# Patient Record
Sex: MALE | Race: WHITE | NOT HISPANIC OR LATINO | Employment: FULL TIME | URBAN - METROPOLITAN AREA
[De-identification: names, ages, dates, MRNs, and addresses within clinical notes are randomized per-mention and may not be internally consistent; named-entity substitution may affect disease eponyms.]

---

## 2017-04-26 ENCOUNTER — HOSPITAL ENCOUNTER (OUTPATIENT)
Dept: ULTRASOUND IMAGING | Facility: CLINIC | Age: 32
Discharge: HOME | End: 2017-04-26
Attending: FAMILY MEDICINE

## 2017-04-26 ENCOUNTER — OFFICE VISIT - HEALTHEAST (OUTPATIENT)
Dept: FAMILY MEDICINE | Facility: CLINIC | Age: 32
End: 2017-04-26

## 2017-04-26 DIAGNOSIS — N45.2 ORCHITIS: ICD-10-CM

## 2017-04-26 DIAGNOSIS — Z48.02 VISIT FOR SUTURE REMOVAL: ICD-10-CM

## 2017-04-27 ENCOUNTER — COMMUNICATION - HEALTHEAST (OUTPATIENT)
Dept: FAMILY MEDICINE | Facility: CLINIC | Age: 32
End: 2017-04-27

## 2017-04-27 DIAGNOSIS — N45.2 ORCHITIS: ICD-10-CM

## 2017-05-05 ENCOUNTER — RECORDS - HEALTHEAST (OUTPATIENT)
Dept: ADMINISTRATIVE | Facility: OTHER | Age: 32
End: 2017-05-05

## 2021-05-30 VITALS — BODY MASS INDEX: 30.93 KG/M2 | WEIGHT: 197.5 LBS

## 2021-05-31 ENCOUNTER — RECORDS - HEALTHEAST (OUTPATIENT)
Dept: ADMINISTRATIVE | Facility: CLINIC | Age: 36
End: 2021-05-31

## 2021-06-10 NOTE — PROGRESS NOTES
ASSESSMENT:  1. Visit for suture removal  - Suture removal    2. Orchitis  - US Scrotum and Testicles W Duplex Ltd; Future    PLAN:  There are no Patient Instructions on file for this visit.    Orders Placed This Encounter   Procedures     Suture removal     This order was created via procedure documentation     US Scrotum and Testicles W Duplex Ltd     Standing Status:   Future     Standing Expiration Date:   4/26/2018     Order Specific Question:   Reason for Exam (Describe Symptoms):     Answer:   Right Testicular pain     Order Specific Question:   Can the procedure be changed per Radiologist protocol?     Answer:   Yes     There are no discontinued medications.    No Follow-up on file.     Patient tolerated the suture removal procedure without any complications. Patient can remove the bandage tomorrow, and the Steri Strips will fall off on their own. Patient can exercise as tolerable. With regard to orchitis, ordered a Scrotum and Testicles Ultrasound; will await results.     CHIEF COMPLAINT:  Chief Complaint   Patient presents with     Suture / Staple Removal     left side suture removal needed, placed at  ER on 4/16       HISTORY OF PRESENT ILLNESS:  Yoseph is a 32 y.o. male presenting to the clinic today for suture removal and for a testicle pain follow-up.     Laceration: He presented to the [facility] ER on April 16th with a laceration after cutting his left flank on a broken glass door. He had 9 stitches placed. Some glue was applied at the ER as well. Today, the laceration area is a little sore, but he otherwise feels fine. He would like to have the stitches removed today.     Testicle Pain: He was last seen for testicle pain on 6/15/2015. He had an Scrotum and Testicles Ultrasound on 6/18/2015 which was negative aside from a small benign cyst in the epididymis of the right testicle. He experiences right testicle soreness occasionally (about 1 or 2 days per month). When the pain occurs, he notices the  "pain more when he moves. Ibuprofen does not improve the pain.     Depression: He has not used bupropion for a couple months because it made him \"feel funny.\" He has been doing better with regard to depression because winter is over.     REVIEW OF SYSTEMS:   Comprehensive review of systems negative except as noted above.    PFSH:  Reviewed, as below.     TOBACCO USE:  History   Smoking Status     Never Smoker   Smokeless Tobacco     Never Used       VITALS:  Vitals:    04/26/17 1347   BP: 112/60   Pulse: 88   Weight: 197 lb 8 oz (89.6 kg)     Wt Readings from Last 3 Encounters:   04/26/17 197 lb 8 oz (89.6 kg)   04/16/17 185 lb (83.9 kg)   12/14/16 187 lb 12.8 oz (85.2 kg)     Body mass index is 30.93 kg/(m^2).       PHYSICAL EXAM:  General Appearance: Alert, cooperative, no distress, appears stated age  HEENT: Pupils equal, symmetric  Lungs: Respirations unlabored  Skin: Left flank stitches, wound held with 9 stitches, no signs of infection, stitches were removed without any complications, slight opening towards the superior aspect of the wound that was closed with Steri Strips.   Neurologic:  Alert and oriented times 3. Normal reflexes. Cranial nerves II-XII intact.   Psychiatric: Normal mood and affect.    Suture removal  Date/Time: 4/26/2017 2:02 PM  Performed by: SAUNDRA SHABAZZ  Authorized by: SAUNDRA SHABAZZ   Body area: trunk  Location details: left flank  Sutures Removed: 9  Post-removal: Steri-Strips applied  Facility: sutures placed in this facility  Patient tolerance: Patient tolerated the procedure well with no immediate complications        ADDITIONAL HISTORY SUMMARIZED (2): Reviewed 6/15/2015 note regarding testicle pain.   DECISION TO OBTAIN EXTRA INFORMATION (1): None.   RADIOLOGY TESTS (1): Ordered Scrotum and Testicles Ultrasound. Reviewed 6/18/2015 Scrotum and Testicles Ultrasound report, indication: Intermittent tenderness in the right testicle.   LABS (1): None.  MEDICINE " TESTS (1): None.  INDEPENDENT REVIEW (2 each): None.     The visit lasted a total of 20 minutes face to face with the patient. Over 50% of the time was spent counseling and educating the patient about suture removal and testicle pain.    IMeir, am scribing for and in the presence of, Dr. Ring.    I, Dr. Ring, personally performed the services described in this documentation, as scribed by Meir Arriola in my presence, and it is both accurate and complete.    MEDICATIONS:  Current Outpatient Prescriptions   Medication Sig Dispense Refill     buPROPion (WELLBUTRIN XL) 150 MG 24 hr tablet Take 1 tablet (150 mg total) by mouth daily. 60 tablet 0     No current facility-administered medications for this visit.        Total data points: 3

## 2021-06-16 PROBLEM — R10.31 RLQ ABDOMINAL PAIN: Status: ACTIVE | Noted: 2018-06-02

## 2021-06-26 ENCOUNTER — HEALTH MAINTENANCE LETTER (OUTPATIENT)
Age: 36
End: 2021-06-26

## 2021-10-16 ENCOUNTER — HEALTH MAINTENANCE LETTER (OUTPATIENT)
Age: 36
End: 2021-10-16

## 2022-02-03 ENCOUNTER — APPOINTMENT (OUTPATIENT)
Dept: ULTRASOUND IMAGING | Facility: CLINIC | Age: 37
End: 2022-02-03
Payer: COMMERCIAL

## 2022-02-03 ENCOUNTER — HOSPITAL ENCOUNTER (EMERGENCY)
Facility: CLINIC | Age: 37
Discharge: HOME | End: 2022-02-03
Attending: EMERGENCY MEDICINE | Admitting: EMERGENCY MEDICINE
Payer: COMMERCIAL

## 2022-02-03 ENCOUNTER — APPOINTMENT (OUTPATIENT)
Dept: MRI IMAGING | Facility: CLINIC | Age: 37
End: 2022-02-03
Attending: EMERGENCY MEDICINE
Payer: COMMERCIAL

## 2022-02-03 VITALS
DIASTOLIC BLOOD PRESSURE: 64 MMHG | BODY MASS INDEX: 34.46 KG/M2 | TEMPERATURE: 99.6 F | OXYGEN SATURATION: 97 % | SYSTOLIC BLOOD PRESSURE: 117 MMHG | RESPIRATION RATE: 20 BRPM | HEART RATE: 83 BPM | WEIGHT: 220 LBS

## 2022-02-03 DIAGNOSIS — R10.84 ABDOMINAL PAIN, GENERALIZED: ICD-10-CM

## 2022-02-03 LAB
ALBUMIN SERPL-MCNC: 4.3 G/DL (ref 3.5–5)
ALP SERPL-CCNC: 71 U/L (ref 45–120)
ALT SERPL W P-5'-P-CCNC: 33 U/L (ref 0–45)
ANION GAP SERPL CALCULATED.3IONS-SCNC: 9 MMOL/L (ref 5–18)
AST SERPL W P-5'-P-CCNC: 26 U/L (ref 0–40)
BILIRUB DIRECT SERPL-MCNC: 0.2 MG/DL
BILIRUB SERPL-MCNC: 0.4 MG/DL (ref 0–1)
BUN SERPL-MCNC: 13 MG/DL (ref 8–22)
CALCIUM SERPL-MCNC: 8.8 MG/DL (ref 8.5–10.5)
CHLORIDE BLD-SCNC: 109 MMOL/L (ref 98–107)
CO2 SERPL-SCNC: 21 MMOL/L (ref 22–31)
CREAT SERPL-MCNC: 1.05 MG/DL (ref 0.7–1.3)
GFR SERPL CREATININE-BSD FRML MDRD: >90 ML/MIN/1.73M2
GLUCOSE BLD-MCNC: 106 MG/DL (ref 70–125)
LIPASE SERPL-CCNC: 35 U/L (ref 0–52)
POTASSIUM BLD-SCNC: 4.1 MMOL/L (ref 3.5–5)
PROT SERPL-MCNC: 7.4 G/DL (ref 6–8)
SODIUM SERPL-SCNC: 139 MMOL/L (ref 136–145)

## 2022-02-03 PROCEDURE — 96375 TX/PRO/DX INJ NEW DRUG ADDON: CPT | Mod: 59

## 2022-02-03 PROCEDURE — 250N000011 HC RX IP 250 OP 636: Performed by: EMERGENCY MEDICINE

## 2022-02-03 PROCEDURE — A9585 GADOBUTROL INJECTION: HCPCS | Performed by: EMERGENCY MEDICINE

## 2022-02-03 PROCEDURE — 250N000013 HC RX MED GY IP 250 OP 250 PS 637

## 2022-02-03 PROCEDURE — 255N000002 HC RX 255 OP 636: Performed by: EMERGENCY MEDICINE

## 2022-02-03 PROCEDURE — 74183 MRI ABD W/O CNTR FLWD CNTR: CPT

## 2022-02-03 PROCEDURE — 36415 COLL VENOUS BLD VENIPUNCTURE: CPT

## 2022-02-03 PROCEDURE — 250N000009 HC RX 250

## 2022-02-03 PROCEDURE — 82248 BILIRUBIN DIRECT: CPT

## 2022-02-03 PROCEDURE — 83690 ASSAY OF LIPASE: CPT

## 2022-02-03 PROCEDURE — 96374 THER/PROPH/DIAG INJ IV PUSH: CPT | Mod: 59

## 2022-02-03 PROCEDURE — 76700 US EXAM ABDOM COMPLETE: CPT

## 2022-02-03 PROCEDURE — 250N000011 HC RX IP 250 OP 636

## 2022-02-03 PROCEDURE — 99285 EMERGENCY DEPT VISIT HI MDM: CPT | Mod: 25

## 2022-02-03 PROCEDURE — 80053 COMPREHEN METABOLIC PANEL: CPT

## 2022-02-03 RX ORDER — FAMOTIDINE 20 MG/1
20 TABLET, FILM COATED ORAL ONCE
Status: COMPLETED | OUTPATIENT
Start: 2022-02-03 | End: 2022-02-03

## 2022-02-03 RX ORDER — ONDANSETRON 4 MG/1
4 TABLET, ORALLY DISINTEGRATING ORAL ONCE
Status: COMPLETED | OUTPATIENT
Start: 2022-02-03 | End: 2022-02-03

## 2022-02-03 RX ORDER — HYDROMORPHONE HYDROCHLORIDE 1 MG/ML
0.5 INJECTION, SOLUTION INTRAMUSCULAR; INTRAVENOUS; SUBCUTANEOUS ONCE
Status: COMPLETED | OUTPATIENT
Start: 2022-02-03 | End: 2022-02-03

## 2022-02-03 RX ORDER — GADOBUTROL 604.72 MG/ML
10 INJECTION INTRAVENOUS ONCE
Status: COMPLETED | OUTPATIENT
Start: 2022-02-03 | End: 2022-02-03

## 2022-02-03 RX ORDER — KETOROLAC TROMETHAMINE 15 MG/ML
15 INJECTION, SOLUTION INTRAMUSCULAR; INTRAVENOUS ONCE
Status: COMPLETED | OUTPATIENT
Start: 2022-02-03 | End: 2022-02-03

## 2022-02-03 RX ORDER — SERTRALINE HYDROCHLORIDE 100 MG/1
100 TABLET, FILM COATED ORAL AT BEDTIME
COMMUNITY

## 2022-02-03 RX ADMIN — HYDROMORPHONE HYDROCHLORIDE 0.5 MG: 1 INJECTION, SOLUTION INTRAMUSCULAR; INTRAVENOUS; SUBCUTANEOUS at 10:57

## 2022-02-03 RX ADMIN — KETOROLAC TROMETHAMINE 15 MG: 15 INJECTION, SOLUTION INTRAMUSCULAR; INTRAVENOUS at 10:52

## 2022-02-03 RX ADMIN — FAMOTIDINE 20 MG: 20 TABLET, FILM COATED ORAL at 08:48

## 2022-02-03 RX ADMIN — GADOBUTROL 10 ML: 604.72 INJECTION INTRAVENOUS at 15:30

## 2022-02-03 RX ADMIN — ONDANSETRON 4 MG: 4 TABLET, ORALLY DISINTEGRATING ORAL at 07:34

## 2022-02-03 RX ADMIN — LIDOCAINE HYDROCHLORIDE 30 ML: 20 SOLUTION TOPICAL at 08:48

## 2022-02-03 NOTE — ED PROVIDER NOTES
Emergency Department Midlevel Supervisory Note     I personally saw the patient and performed a substantive portion of the visit including all aspects of the medical decision making.    IMPRESSION  No diagnosis found.        MDM  36-year-old male presented to the ED for evaluation of epigastric abdominal pain that woke him from sleep earlier this morning.  Patient denied any other significant symptoms.  In the ED the patient was noted to be slightly hypertensive but he was otherwise hemodynamically stable.  The patient did not appear to be in any obvious distress or discomfort at the time of my evaluation.  On exam he was noted to have mild epigastric tenderness palpation without evidence of an acute abdomen.    CBC, BMP, hepatic panel, lipase, and right upper quadrant ultrasound were all reassuring.    The patient was reevaluated and informed of the reassuring lab and ultrasound results.  The patient was given a GI cocktail and oral famotidine to treat suspected GERD/PUD.    The patient was reevaluated after receiving the GI cocktail and famotidine.  The patient reported no change in his abdominal discomfort.  The patient's case was then discussed with the on-call gastroenterologist who stated that an MRCP could be considered.  The patient was again reevaluated.  He stated that the diagnosis of choledocholithiasis was initially missed on at least one or two occasions in the past until an MRCP was performed.  An IV was then placed.  The patient was given IV pain medications and an MRCP was ordered.     Patient reported that his pain is improved with the IV Dilaudid.  Currently the patient is scheduled to undergo ERCP at 15:00 today.  The patient's care will be turned over to the oncoming ED provider Dr. Ray.  If there is no stone noted on the MRCP the plan is for discharge home with outpatient follow-up.        ED Course:  7:00 AM Dr. Christiane Sibley, resident, staffed patient with me. I agree with their  assessment and plan of management, and I will see the patient.  I met with the patient to introduce myself, gather additional history, perform my initial exam, and discuss the plan.   12:39 PM Plan for MRCP at 3pm today. Patient updated. Will be signed out to Dr. Ray pending MRCP.     Brief HPI:     Yoseph Duong is a 36 year old male with a pertinent PMHx of cholecystectomy and choledocholithiasis who presents for evaluation of epigastric abdominal pain which began last night. His pain is non-radiating and waxes/wanes in severity, currently a 5/10 but does increase to a 10/10 at times. His current pain feels the same as when he had his gallbladder removed. Patient states he has known bile duct stones, seen on MRI and ERCP. Also endorses mild nausea and 1 episode of diarrhea this morning. No vomiting. No fever. Of note, patient is in remission of testicular cancer s/p left orchioectomy.     I, Hemal Cameron, am serving as a scribe to document services personally performed by Dr. Carlos Dover, based on my observations and the provider's statements to me.   I, Carlos Dover, DO, attest that Hemal Cameron was acting in a scribe capacity, has observed my performance of the services and has documented them in accordance with my direction.    Brief Physical Exam:  Constitutional:  Alert, in no acute distress  EYES: Conjunctivae clear  HENT:  Atraumatic, normocephalic  Respiratory:  Respirations even, unlabored, in no acute respiratory distress  Cardiovascular:  Regular rate and rhythm, good peripheral perfusion  GI: Soft, nondistended, nontender, no palpable masses, no rebound, no guarding   Musculoskeletal:  No edema. No cyanosis. Range of motion major extremities intact.    Integument: Warm, Dry, No erythema, No rash.   Neurologic:  Alert & oriented, no focal deficits noted  Psych: Normal mood and affect    Labs and Imaging:  Results for orders placed or performed during the hospital encounter of 02/03/22   Abdomen  US, complete    Impression    IMPRESSION:  1.  No acute abnormality in the abdomen.       Result Value Ref Range    Lipase 35 0 - 52 U/L   Basic metabolic panel   Result Value Ref Range    Sodium 139 136 - 145 mmol/L    Potassium 4.1 3.5 - 5.0 mmol/L    Chloride 109 (H) 98 - 107 mmol/L    Carbon Dioxide (CO2) 21 (L) 22 - 31 mmol/L    Anion Gap 9 5 - 18 mmol/L    Urea Nitrogen 13 8 - 22 mg/dL    Creatinine 1.05 0.70 - 1.30 mg/dL    Calcium 8.8 8.5 - 10.5 mg/dL    Glucose 106 70 - 125 mg/dL    GFR Estimate >90 >60 mL/min/1.73m2   Hepatic function panel   Result Value Ref Range    Bilirubin Total 0.4 0.0 - 1.0 mg/dL    Bilirubin Direct 0.2 <=0.5 mg/dL    Protein Total 7.4 6.0 - 8.0 g/dL    Albumin 4.3 3.5 - 5.0 g/dL    Alkaline Phosphatase 71 45 - 120 U/L    AST 26 0 - 40 U/L    ALT 33 0 - 45 U/L     I have reviewed the relevant laboratory and radiology studies    Procedures:  I was present for the key portions of this procedure: none    Carlos Dover DO  [facility] EMERGENCY ROOM  [facility] [facility] [id number] [phone]     Carlos Dover DO  02/03/22 7117

## 2022-02-03 NOTE — ED PROVIDER NOTES
EMERGENCY DEPARTMENT SIGN OUT NOTE      ED COURSE AND MEDICAL DECISION MAKING  Patient was signed out to me by Dr. Carlos Dover at 2:08 PM      In brief, Yoseph Duong is a 36 year old with a pertinent PMHx of cholecystectomy and choledocholithiasis who presents for evaluation of epigastric abdominal pain which began last night. His pain is non-radiating and waxes/wanes in severity, currently a 5/10 but does increase to a 10/10 at times. His current pain feels the same as when he had his gallbladder removed. Patient states he has known bile duct stones, seen on MRI and ERCP. Also endorses mild nausea and 1 episode of diarrhea this morning. No vomiting. No fever. Of note, patient is in remission of testicular cancer s/p left orchioectomy.    At time of sign out, disposition was pending MRCP.    MRCP returned normal.  LFTs, lipase, bilirubin all within normal limits.  Discussed findings with patient, discussed plan of discharge home, can return to ED if symptoms worsen.  If symptoms return or worsen, be worth discussing with GI about doing an ERCP in the future.    The creation of this record is based on the scribe s observations of the work being performed by Cirilo Ray MD, and the provider s statements to them. This document has been checked and approved by Cirilo Ray MD.    FINAL IMPRESSION    1. Abdominal pain, generalized      RADIOLOGY    MR Abdomen MRCP w/o & w Contrast   Final Result   IMPRESSION:   1.  Bile duct caliber within normal limits with no stone, stricture or mass.   2.  Cholecystectomy.   3.  Mild diffuse hepatic steatosis.      Abdomen US, complete   Final Result   IMPRESSION:   1.  No acute abnormality in the abdomen.                 Cirilo Ray MD  02/03/22 0447

## 2022-02-03 NOTE — ED TRIAGE NOTES
Started at 0530 am. Pain described as sharp.  States pain does not go through to back.  Denies having taken any meds for pain.

## 2022-02-03 NOTE — ED PROVIDER NOTES
EMERGENCY DEPARTMENT ENCOUNTER      CHIEF COMPLAINT    Epigastric pain      BEHZAD Hameed is a 36 year old male with a past medical history of testicular cancer s/p left orchiectomy in remission, overweight, recurrent major depressive disorder in partial remission, migraines, cholecystectomy, and choledocholithiasis. He presents to the [facility] emergency department for increased epigastric pain which started early this morning.    Patient states the pain is similar to the last time he had a stone in his bile ducts. He had a cholecystectomy and later had an ERCP for choledocholithiasis (10/24/2021). Biliary sphincterotomy was performed and one plastic biliary stent was placed into the common bile duct at the same time. His pain is sharp and does not radiate. It waxes and wanes. Currently he rates the pain as a 5/10. At the worst, it is a 10/10. The most comfortable position is standing and leaning forward slightly. Laying down is okay. Sitting up is the worst. He states it is not related to foods he eats. He endorses nausea and one episode of diarrhea this morning.       REVIEW OF SYSTEMS      Constitutional: Negative for fever, chills, and fatigue.      HENT: Negative for neck pain.      Eyes: Negative for visual disturbance.     Respiratory: Negative for chest tightness. Positive for shortness of breath when pain exacerbates.      Cardiovascular: Negative for chest pain.     Gastrointestinal: Negative for vomiting and diarrhea. Positive for nausea and non-radiating epigastric pain.    Genitourinary: Negative for dysuria.     Musculoskeletal: Negative for back pain.     Skin: Negative for color change.     Neurological: Negative for dizziness, weakness, and numbness.     All other systems reviewed and are negative.      PAST MEDICAL HISTORY      No past medical history on file. and   Patient Active Problem List   Diagnosis     Overweight     Recurrent major depressive disorder, in partial remission (H)     ADHD,  Predominantly Inattentive Type     Common Migraine (Without Aura)     Breast pain, right     Orchitis     Choledocholithiasis     Elevated LFTs     RLQ abdominal pain     Leukocytosis, unspecified type         SURGICAL HISTORY       Adenoidectomy    Laparoscopic cholecystectomy (N/A, 12/9/2014)    Endoscopic retrograde cholangiopancreatogram (N/A, 12/7/2016)         CURRENT MEDICATIONS      Patient's Medications   New Prescriptions    No medications on file   Previous Medications    SENNA-DOCUSATE (PERICOLACE) 8.6-50 MG TABLET    [SENNA-DOCUSATE (PERICOLACE) 8.6-50 MG TABLET] Take 1 tablet by mouth 2 (two) times a day as needed for constipation.    TRAMADOL (ULTRAM) 50 MG TABLET    [TRAMADOL (ULTRAM) 50 MG TABLET] Take 1 tablet (50 mg total) by mouth every 8 (eight) hours as needed for pain.   Modified Medications    No medications on file   Discontinued Medications    No medications on file         ALLERGIES      Allergies   Allergen Reactions     Haemophilus Influenzae Anaphylaxis and Hives         FAMILY HISTORY      Family History   Problem Relation Age of Onset     CABG Father      Coronary Artery Disease Paternal Grandfather          SOCIAL HISTORY      Social History     Socioeconomic History     Marital status:      Spouse name: Not on file     Number of children: Not on file     Years of education: Not on file     Highest education level: Not on file   Occupational History     Not on file   Tobacco Use     Smoking status: Never Smoker     Smokeless tobacco: Never Used   Substance and Sexual Activity     Alcohol use: No     Drug use: No     Sexual activity: Not on file   Other Topics Concern     Not on file   Social History Narrative     Not on file     Social Determinants of Health     Financial Resource Strain: Not on file   Food Insecurity: Not on file   Transportation Needs: Not on file   Physical Activity: Not on file   Stress: Not on file   Social Connections: Not on file   Intimate Partner  Violence: Not on file   Housing Stability: Not on file         PHYSICAL EXAM      VITAL SIGNS: BP (!) 147/88   Pulse 89   Resp 20   Wt 99.8 kg (220 lb)   SpO2 98%   BMI 34.46 kg/m         Constitutional:  Well developed, well nourished, no acute distress     EYES: Conjunctivae clear    HENT:  Atraumatic, external ears normal.    Respiratory:  No respiratory distress, normal breath sounds, no rales, no wheezing     Cardiovascular:  Normal rate, normal rhythm, no murmurs.  No chest tenderness    GI:  Soft, nondistended, no palpable masses, no rebound, no guarding. Tenderness in epigastric region.     Musculoskeletal:  No edema.  Range of motion major extremities intact. No tenderness to palpation or major deformities noted.      Integument: Warm, Dry, No erythema, No rash.     Neurologic:  Alert, ambulatory    Psych: Affect normal, Mood normal.    LABS:  Hepatic function panel:  Bilirubin Total 0.4   Bilirubin Direct 0.2   Protein Total 7.4   Albumin 4.3   Alkaline Phosphatase 71   AST 26   ALT 33     Lipase: 35    BMP:  Sodium 139   Potassium 4.1   Chloride 109   Carbon Dioxide 21   Anion Gap 9   Urea Nitrogen 13   Creatinine 1.05   Calcium 8.8   Glucose 106   GFR Estimate >90       IMAGING:   Abdomen US, complete  FINDINGS:     GALLBLADDER: Surgically removed.     BILE DUCTS: No biliary dilatation. The common duct measures 6 mm. No shadowing stone in the visualized portions of the common bile duct.     LIVER: Normal parenchyma with smooth contour. No focal mass.     RIGHT KIDNEY: Normal size. Normal echogenicity with no hydronephrosis or mass.      LEFT KIDNEY: Normal size. No hydronephrosis. A simple 0.8 cm cyst in the lower pole, no follow-up required.      SPLEEN: Normal.     PANCREAS: The visualized portions are normal.     AORTA: Normal in caliber.      IVC: Normal where visualized.     No ascites.                                              IMPRESSION:  1.  No acute abnormality in the abdomen.      ED  COURSE & MEDICAL DECISION MAKING    0657: I met the patient and obtained a history and physical.  0830: Patient's ultrasound shows no abnormality in the abdomen. He is given famotidine and a GI cocktail. Will reassess for improvement.  0850: Dr. Carlos Dover reevaluated the patient.  0925: I reassessed patient and he states he had no improvement with GI cocktail.  1005: Patient states he still has no improvement in his pain. Will contact GI provider to see if anymore studies should be done at this time.  1015: Talked to Dr. Mayer, gastroenterologist, about further work-up. In her expertise, she believes no additional imaging is need at this time due to laboratory work being unremarkable and only one day of symptoms. MRCP can be used for reassurance for patient if requested. Another option is to have follow up with PCP for hepatic function monitoring if symptoms are progressive or continual.  1030: Dr. Dover talked to patient. Patient was addiment about getting MRCP because last time he had normal lab work and imaging until the MRCP. We will schedule MRCP and give Toradol for pain.  1230: Dr. Dover called radiology to find that MRCP will be performed around 1500 today. Patient was updated on information.  1530: Sign-out given to Dr. Ray.      Christiane Sibley MD PGY1  [facility] Resident       Christiane Sibley MD  Resident  02/03/22 1436

## 2022-02-03 NOTE — PHARMACY-ADMISSION MEDICATION HISTORY
Pharmacy Note - Admission Medication History    Pertinent Provider Information: n/a     ______________________________________________________________________    Prior To Admission (PTA) med list completed and updated in EMR.       PTA Med List   Medication Sig Last Dose     phenylephrine-bropheniramine-dextromethorphan (DIMETAPP-DM) 2.5-1-5 MG/5ML ELIX solution Take 10 mLs by mouth every 6 hours as needed for cough 2/2/2022 at Unknown time     sertraline (ZOLOFT) 100 MG tablet Take 100 mg by mouth At Bedtime 2/2/2022 at Unknown time       Information source(s): Patient and CareEverywhere/SureScripts  Method of interview communication: in-person    Summary of Changes to PTA Med List  New: sertraline, dimetapp  Discontinued: senna-s, tramadol  Changed: none    Patient was asked about OTC/herbal products specifically.  PTA med list reflects this.    In the past week, patient estimated taking medication this percent of the time:  greater than 90%.    Allergies were reviewed, assessed, and updated with the patient.      Patient does not use any multi-dose medications prior to admission.    The information provided in this note is only as accurate as the sources available at the time of the update(s).    Thank you for the opportunity to participate in the care of this patient.    [facility], located within [facility]  2/3/2022 12:58 PM

## 2022-02-27 ENCOUNTER — APPOINTMENT (OUTPATIENT)
Dept: CT IMAGING | Facility: CLINIC | Age: 37
End: 2022-02-27
Attending: EMERGENCY MEDICINE
Payer: COMMERCIAL

## 2022-02-27 ENCOUNTER — HOSPITAL ENCOUNTER (EMERGENCY)
Facility: CLINIC | Age: 37
Discharge: HOME | End: 2022-02-27
Attending: EMERGENCY MEDICINE | Admitting: EMERGENCY MEDICINE
Payer: COMMERCIAL

## 2022-02-27 VITALS
DIASTOLIC BLOOD PRESSURE: 81 MMHG | SYSTOLIC BLOOD PRESSURE: 120 MMHG | WEIGHT: 213 LBS | BODY MASS INDEX: 33.36 KG/M2 | HEART RATE: 59 BPM | RESPIRATION RATE: 16 BRPM | OXYGEN SATURATION: 93 %

## 2022-02-27 DIAGNOSIS — R10.11 ABDOMINAL PAIN, RIGHT UPPER QUADRANT: ICD-10-CM

## 2022-02-27 PROBLEM — Z90.79 S/P ORCHIECTOMY: Status: ACTIVE | Noted: 2021-10-24

## 2022-02-27 PROBLEM — C62.92: Status: ACTIVE | Noted: 2021-10-24

## 2022-02-27 LAB
ALBUMIN SERPL-MCNC: 4.5 G/DL (ref 3.5–5)
ALBUMIN UR-MCNC: 20 MG/DL
ALP SERPL-CCNC: 78 U/L (ref 45–120)
ALT SERPL W P-5'-P-CCNC: 55 U/L (ref 0–45)
ANION GAP SERPL CALCULATED.3IONS-SCNC: 14 MMOL/L (ref 5–18)
APPEARANCE UR: CLEAR
AST SERPL W P-5'-P-CCNC: 25 U/L (ref 0–40)
BASOPHILS # BLD AUTO: 0.1 10E3/UL (ref 0–0.2)
BASOPHILS NFR BLD AUTO: 1 %
BILIRUB SERPL-MCNC: 0.5 MG/DL (ref 0–1)
BILIRUB UR QL STRIP: NEGATIVE
BUN SERPL-MCNC: 14 MG/DL (ref 8–22)
CALCIUM SERPL-MCNC: 8.9 MG/DL (ref 8.5–10.5)
CHLORIDE BLD-SCNC: 108 MMOL/L (ref 98–107)
CO2 SERPL-SCNC: 18 MMOL/L (ref 22–31)
COLOR UR AUTO: YELLOW
CREAT SERPL-MCNC: 1.07 MG/DL (ref 0.7–1.3)
EOSINOPHIL # BLD AUTO: 0.1 10E3/UL (ref 0–0.7)
EOSINOPHIL NFR BLD AUTO: 1 %
ERYTHROCYTE [DISTWIDTH] IN BLOOD BY AUTOMATED COUNT: 12.7 % (ref 10–15)
GFR SERPL CREATININE-BSD FRML MDRD: >90 ML/MIN/1.73M2
GLUCOSE BLD-MCNC: 139 MG/DL (ref 70–125)
GLUCOSE UR STRIP-MCNC: NEGATIVE MG/DL
HCT VFR BLD AUTO: 45.9 % (ref 40–53)
HGB BLD-MCNC: 16.2 G/DL (ref 13.3–17.7)
HGB UR QL STRIP: NEGATIVE
IMM GRANULOCYTES # BLD: 0 10E3/UL
IMM GRANULOCYTES NFR BLD: 0 %
KETONES UR STRIP-MCNC: 60 MG/DL
LEUKOCYTE ESTERASE UR QL STRIP: NEGATIVE
LIPASE SERPL-CCNC: 33 U/L (ref 0–52)
LYMPHOCYTES # BLD AUTO: 5.2 10E3/UL (ref 0.8–5.3)
LYMPHOCYTES NFR BLD AUTO: 55 %
MCH RBC QN AUTO: 29.3 PG (ref 26.5–33)
MCHC RBC AUTO-ENTMCNC: 35.3 G/DL (ref 31.5–36.5)
MCV RBC AUTO: 83 FL (ref 78–100)
MONOCYTES # BLD AUTO: 0.8 10E3/UL (ref 0–1.3)
MONOCYTES NFR BLD AUTO: 8 %
MUCOUS THREADS #/AREA URNS LPF: PRESENT /LPF
NEUTROPHILS # BLD AUTO: 3.4 10E3/UL (ref 1.6–8.3)
NEUTROPHILS NFR BLD AUTO: 35 %
NITRATE UR QL: NEGATIVE
NRBC # BLD AUTO: 0 10E3/UL
NRBC BLD AUTO-RTO: 0 /100
PH UR STRIP: 5 [PH] (ref 5–7)
PLAT MORPH BLD: ABNORMAL
PLATELET # BLD AUTO: 357 10E3/UL (ref 150–450)
POTASSIUM BLD-SCNC: 4 MMOL/L (ref 3.5–5)
PROT SERPL-MCNC: 7.7 G/DL (ref 6–8)
RBC # BLD AUTO: 5.52 10E6/UL (ref 4.4–5.9)
RBC MORPH BLD: ABNORMAL
RBC URINE: 0 /HPF
SODIUM SERPL-SCNC: 140 MMOL/L (ref 136–145)
SP GR UR STRIP: 1.03 (ref 1–1.03)
UROBILINOGEN UR STRIP-MCNC: <2 MG/DL
VARIANT LYMPHS BLD QL SMEAR: PRESENT
WBC # BLD AUTO: 9.6 10E3/UL (ref 4–11)
WBC URINE: 1 /HPF

## 2022-02-27 PROCEDURE — 96374 THER/PROPH/DIAG INJ IV PUSH: CPT

## 2022-02-27 PROCEDURE — 96375 TX/PRO/DX INJ NEW DRUG ADDON: CPT

## 2022-02-27 PROCEDURE — 85025 COMPLETE CBC W/AUTO DIFF WBC: CPT | Performed by: EMERGENCY MEDICINE

## 2022-02-27 PROCEDURE — 81001 URINALYSIS AUTO W/SCOPE: CPT | Performed by: EMERGENCY MEDICINE

## 2022-02-27 PROCEDURE — 82040 ASSAY OF SERUM ALBUMIN: CPT | Performed by: EMERGENCY MEDICINE

## 2022-02-27 PROCEDURE — 74176 CT ABD & PELVIS W/O CONTRAST: CPT

## 2022-02-27 PROCEDURE — 250N000011 HC RX IP 250 OP 636: Performed by: EMERGENCY MEDICINE

## 2022-02-27 PROCEDURE — 36415 COLL VENOUS BLD VENIPUNCTURE: CPT | Performed by: EMERGENCY MEDICINE

## 2022-02-27 PROCEDURE — 83690 ASSAY OF LIPASE: CPT | Performed by: EMERGENCY MEDICINE

## 2022-02-27 PROCEDURE — 99285 EMERGENCY DEPT VISIT HI MDM: CPT | Mod: 25

## 2022-02-27 PROCEDURE — 258N000003 HC RX IP 258 OP 636: Performed by: EMERGENCY MEDICINE

## 2022-02-27 PROCEDURE — 96361 HYDRATE IV INFUSION ADD-ON: CPT

## 2022-02-27 PROCEDURE — 80053 COMPREHEN METABOLIC PANEL: CPT | Performed by: EMERGENCY MEDICINE

## 2022-02-27 PROCEDURE — 96376 TX/PRO/DX INJ SAME DRUG ADON: CPT

## 2022-02-27 RX ORDER — OXYCODONE HYDROCHLORIDE 5 MG/1
5 TABLET ORAL EVERY 6 HOURS PRN
Qty: 12 TABLET | Refills: 0 | Status: SHIPPED | OUTPATIENT
Start: 2022-02-27 | End: 2022-03-02

## 2022-02-27 RX ORDER — SODIUM CHLORIDE 9 MG/ML
INJECTION, SOLUTION INTRAVENOUS CONTINUOUS
Status: DISCONTINUED | OUTPATIENT
Start: 2022-02-27 | End: 2022-02-27 | Stop reason: HOSPADM

## 2022-02-27 RX ORDER — HYDROMORPHONE HYDROCHLORIDE 1 MG/ML
0.5 INJECTION, SOLUTION INTRAMUSCULAR; INTRAVENOUS; SUBCUTANEOUS
Status: DISCONTINUED | OUTPATIENT
Start: 2022-02-27 | End: 2022-02-27 | Stop reason: HOSPADM

## 2022-02-27 RX ORDER — KETOROLAC TROMETHAMINE 15 MG/ML
15 INJECTION, SOLUTION INTRAMUSCULAR; INTRAVENOUS ONCE
Status: COMPLETED | OUTPATIENT
Start: 2022-02-27 | End: 2022-02-27

## 2022-02-27 RX ORDER — DROPERIDOL 2.5 MG/ML
2.5 INJECTION, SOLUTION INTRAMUSCULAR; INTRAVENOUS ONCE
Status: COMPLETED | OUTPATIENT
Start: 2022-02-27 | End: 2022-02-27

## 2022-02-27 RX ADMIN — DROPERIDOL 2.5 MG: 2.5 INJECTION, SOLUTION INTRAMUSCULAR; INTRAVENOUS at 03:55

## 2022-02-27 RX ADMIN — HYDROMORPHONE HYDROCHLORIDE 0.5 MG: 1 INJECTION, SOLUTION INTRAMUSCULAR; INTRAVENOUS; SUBCUTANEOUS at 04:00

## 2022-02-27 RX ADMIN — KETOROLAC TROMETHAMINE 15 MG: 15 INJECTION, SOLUTION INTRAMUSCULAR; INTRAVENOUS at 03:53

## 2022-02-27 RX ADMIN — HYDROMORPHONE HYDROCHLORIDE 0.5 MG: 1 INJECTION, SOLUTION INTRAMUSCULAR; INTRAVENOUS; SUBCUTANEOUS at 04:33

## 2022-02-27 RX ADMIN — SODIUM CHLORIDE 1000 ML: 9 INJECTION, SOLUTION INTRAVENOUS at 03:57

## 2022-02-27 ASSESSMENT — ENCOUNTER SYMPTOMS
NAUSEA: 1
HEMATURIA: 0
VOMITING: 0
ABDOMINAL PAIN: 1
DIFFICULTY URINATING: 0
DIARRHEA: 0
CONSTIPATION: 0

## 2022-02-27 NOTE — ED PROVIDER NOTES
EMERGENCY DEPARTMENT ENCOUNTER      NAME: Yoseph Duong  AGE: 37 year old male  YOB: 1985  MRN: [id number]  EVALUATION DATE & TIME: 2/27/2022  3:38 AM    PCP: Carlie Cerrato    ED PROVIDER: Ryan Cook M.D.      Chief Complaint   Patient presents with     Abdominal Pain         FINAL IMPRESSION:  1. Abdominal pain, right upper quadrant          ED COURSE & MEDICAL DECISION MAKING:    Pertinent Labs & Imaging studies reviewed. (See chart for details)  37 year old male presents to the Emergency Department for evaluation of right upper quadrant pain. Sudden onset of right upper quadrant pain. Is status post cholecystectomy choledocholithiasis. Had similar symptoms in the beginning of this month. Did get a CT scan no obvious cause of his symptoms. Labs are normal. Pain improved with IV Toradol and IV droperidol. Does have some pneumobilia on the CT scan though this is unchanged. During previous visit had an MRCP that was normal. No signs of kidney stone. No signs of bowel obstruction. Has an appointment on Tuesday with GI. We will have him follow-up then. Did give him pain medicine for home. Will return for worsening symptoms    3:40 AM I met with the patient to gather history and to perform my initial exam. I discussed the plan for care while in the Emergency Department. PPE: Provider wore gloves, eye protection, and paper mask.       4:55 AM I rechecked and updated the patient. I discussed the plan for discharge with the patient, and patient is agreeable. We discussed supportive cares at home and reasons for return to the ER including new or worsening symptoms - all questions and concerns addressed. Patient to be discharged by RN.     At the conclusion of the encounter I discussed the results of all of the tests and the disposition. The questions were answered. The patient or family acknowledged understanding and was agreeable with the care plan.            MEDICATIONS GIVEN IN THE  EMERGENCY:  Medications   0.9% sodium chloride BOLUS (0 mLs Intravenous Stopped 2/27/22 0500)     Followed by   sodium chloride 0.9% infusion ( Intravenous Not Given 2/27/22 0518)   HYDROmorphone (PF) (DILAUDID) injection 0.5 mg (0.5 mg Intravenous Given 2/27/22 0433)   droperidol (INAPSINE) injection 2.5 mg (2.5 mg Intravenous Given 2/27/22 0355)   ketorolac (TORADOL) injection 15 mg (15 mg Intravenous Given 2/27/22 0353)       NEW PRESCRIPTIONS STARTED AT TODAY'S ER VISIT  Discharge Medication List as of 2/27/2022  5:10 AM      START taking these medications    Details   oxyCODONE (ROXICODONE) 5 MG tablet Take 1 tablet (5 mg) by mouth every 6 hours as needed, Disp-12 tablet, R-0, Local Print                =================================================================    HPI    Patient information was obtained from: The patient    Use of : N/A       Yoseph KIM Ad is a 37 year old male with a pertinent history of cholecystectomy, and choledocholithiasis who presents to this ED via walk-in for evaluation of RUQ abdominal pain.    The patient reports that at 0230 this morning (2/27) he had onset of an abdominal discomfort that has progressively worsened. Approximately 15 minutes prior to arrival his pain became severely worse and it is localized to his right upper quadrant. Additionally, he endorses some nausea but denies vomiting. The patient has previously been evaluated for abdominal pain but states currently his pain is much worse than before. He notes that his wife drove him to the ED tonight. The patient denies vomiting, difficulty urinating, constipation, diarrhea, blood in urine, and any other symptoms or complaints at this time.       Per Chart Review,  02/03/2022 The patient presented to [facility]'s ER for evaluation of abdominal pain. He had known bile duct stones (previously seen on MRI and ERCP). RUQ US was reassuring. MRCP returned normal.  LFTs, lipase, bilirubin all within normal  limits. Provider discussed return precautions and discharged the patient.    REVIEW OF SYSTEMS   Review of Systems   Gastrointestinal: Positive for abdominal pain (RUQ) and nausea. Negative for constipation, diarrhea and vomiting.   Genitourinary: Negative for difficulty urinating and hematuria.   All other systems reviewed and are negative.       PAST MEDICAL HISTORY:  History reviewed. No pertinent past medical history.    PAST SURGICAL HISTORY:  Past Surgical History:   Procedure Laterality Date     ADENOIDECTOMY       ENDOSCOPIC RETROGRADE CHOLANGIOPANCREATOGRAM N/A 12/7/2016    Procedure: ENDOSCOPIC RETROGRADE CHOLANGIOPANCREATOGRAPHY;  Surgeon: Theodore Rojas MD;  Location: [facility];  Service:      LAPAROSCOPIC CHOLECYSTECTOMY N/A 12/9/2014    Procedure: CHOLECYSTECTOMY LAPAROSCOPIC;  Surgeon: Kashif Coffman MD;  Location: [facility] OR;  Service:            CURRENT MEDICATIONS:    Current Facility-Administered Medications   Medication     HYDROmorphone (PF) (DILAUDID) injection 0.5 mg     sodium chloride 0.9% infusion     Current Outpatient Medications   Medication     oxyCODONE (ROXICODONE) 5 MG tablet     phenylephrine-bropheniramine-dextromethorphan (DIMETAPP-DM) 2.5-1-5 MG/5ML ELIX solution     sertraline (ZOLOFT) 100 MG tablet         ALLERGIES:  Allergies   Allergen Reactions     Haemophilus Influenzae Anaphylaxis and Hives       FAMILY HISTORY:  Family History   Problem Relation Age of Onset     CABG Father      Coronary Artery Disease Paternal Grandfather        SOCIAL HISTORY:   Social History     Socioeconomic History     Marital status:      Spouse name: Not on file     Number of children: Not on file     Years of education: Not on file     Highest education level: Not on file   Occupational History     Not on file   Tobacco Use     Smoking status: Never Smoker     Smokeless tobacco: Never Used   Substance and Sexual Activity     Alcohol use: No     Drug use: No     Sexual  activity: Not on file   Other Topics Concern     Not on file   Social History Narrative     Not on file     Social Determinants of Health     Financial Resource Strain: Not on file   Food Insecurity: Not on file   Transportation Needs: Not on file   Physical Activity: Not on file   Stress: Not on file   Social Connections: Not on file   Intimate Partner Violence: Not on file   Housing Stability: Not on file       VITALS:  /81   Pulse 59   Resp 16   Wt 96.6 kg (213 lb)   SpO2 93%   BMI 33.36 kg/m      PHYSICAL EXAM    Physical Exam  Constitutional:       General: He is not in acute distress.     Appearance: He is not diaphoretic.   HENT:      Head: Atraumatic.   Eyes:      General: No scleral icterus.     Pupils: Pupils are equal, round, and reactive to light.   Cardiovascular:      Heart sounds: Normal heart sounds.   Pulmonary:      Effort: No respiratory distress.      Breath sounds: Normal breath sounds.   Abdominal:      General: Bowel sounds are normal.      Palpations: Abdomen is soft.      Tenderness: There is abdominal tenderness in the right upper quadrant.   Musculoskeletal:         General: No tenderness.   Skin:     General: Skin is warm.      Findings: No rash.           LAB:  All pertinent labs reviewed and interpreted.  Labs Ordered and Resulted from Time of ED Arrival to Time of ED Departure   COMPREHENSIVE METABOLIC PANEL - Abnormal       Result Value    Sodium 140      Potassium 4.0      Chloride 108 (*)     Carbon Dioxide (CO2) 18 (*)     Anion Gap 14      Urea Nitrogen 14      Creatinine 1.07      Calcium 8.9      Glucose 139 (*)     Alkaline Phosphatase 78      AST 25      ALT 55 (*)     Protein Total 7.7      Albumin 4.5      Bilirubin Total 0.5      GFR Estimate >90     ROUTINE UA WITH MICROSCOPIC REFLEX TO CULTURE - Abnormal    Color Urine Yellow      Appearance Urine Clear      Glucose Urine Negative      Bilirubin Urine Negative      Ketones Urine 60  (*)     Specific Gravity  Urine 1.031 (*)     Blood Urine Negative      pH Urine 5.0      Protein Albumin Urine 20  (*)     Urobilinogen Urine <2.0      Nitrite Urine Negative      Leukocyte Esterase Urine Negative      Mucus Urine Present (*)     RBC Urine 0      WBC Urine 1     RBC AND PLATELET MORPHOLOGY - Abnormal    Platelet Assessment        Value: Automated Count Confirmed. Platelet morphology is normal.    Reactive Lymphocytes Present (*)     RBC Morphology Confirmed RBC Indices     LIPASE - Normal    Lipase 33     CBC WITH PLATELETS AND DIFFERENTIAL    WBC Count 9.6      RBC Count 5.52      Hemoglobin 16.2      Hematocrit 45.9      MCV 83      MCH 29.3      MCHC 35.3      RDW 12.7      Platelet Count 357      % Neutrophils 35      % Lymphocytes 55      % Monocytes 8      % Eosinophils 1      % Basophils 1      % Immature Granulocytes 0      NRBCs per 100 WBC 0      Absolute Neutrophils 3.4      Absolute Lymphocytes 5.2      Absolute Monocytes 0.8      Absolute Eosinophils 0.1      Absolute Basophils 0.1      Absolute Immature Granulocytes 0.0      Absolute NRBCs 0.0         RADIOLOGY:  Reviewed all pertinent imaging. Please see official radiology report.  CT Abdomen Pelvis w/o Contrast   Final Result   IMPRESSION:    1.  No acute abnormality. No bowel obstruction or inflammation.   2.  Pneumobilia as seen previously.             EKG:    None    PROCEDURES:   None      I, Reji Whitten, am serving as a scribe to document services personally performed by Dr. Ryan Cook, based on my observation and the provider's statements to me. I, Ryan Cook MD attest that Reji Whitten is acting in a scribe capacity, has observed my performance of the services and has documented them in accordance with my direction.    Ryan Cook M.D.  Emergency Medicine  [facility] EMERGENCY ROOM  [address] [id number] [phone]  Dept: [phone]     Ryan Cook,  MD  02/27/22 0539

## 2022-02-27 NOTE — ED TRIAGE NOTES
Presents to the ED with RUQ pain that started around 0200 and is now worse  Positive for nausea   Denies other complaints  Pt is moaning and grunting in pain   Restless on assessment and prefers to stand up   Hx fatty liver  No gallbladder

## 2022-02-27 NOTE — ED NOTES
Reports 6/10 pain after medications  Denies wanting more pain medication at this time     Pt is now resting in bed, appears more comfortable and less restless

## 2022-07-17 ENCOUNTER — HEALTH MAINTENANCE LETTER (OUTPATIENT)
Age: 37
End: 2022-07-17

## 2022-09-25 ENCOUNTER — HEALTH MAINTENANCE LETTER (OUTPATIENT)
Age: 37
End: 2022-09-25

## 2023-07-22 ENCOUNTER — HOSPITAL ENCOUNTER (EMERGENCY)
Facility: CLINIC | Age: 38
Discharge: HOME | End: 2023-07-22
Attending: EMERGENCY MEDICINE | Admitting: EMERGENCY MEDICINE
Payer: COMMERCIAL

## 2023-07-22 ENCOUNTER — APPOINTMENT (OUTPATIENT)
Dept: CT IMAGING | Facility: CLINIC | Age: 38
End: 2023-07-22
Attending: EMERGENCY MEDICINE
Payer: COMMERCIAL

## 2023-07-22 VITALS
BODY MASS INDEX: 27.47 KG/M2 | OXYGEN SATURATION: 98 % | TEMPERATURE: 98.8 F | DIASTOLIC BLOOD PRESSURE: 66 MMHG | HEIGHT: 67 IN | SYSTOLIC BLOOD PRESSURE: 114 MMHG | WEIGHT: 175 LBS | RESPIRATION RATE: 18 BRPM | HEART RATE: 83 BPM

## 2023-07-22 DIAGNOSIS — R10.31 RLQ ABDOMINAL PAIN: ICD-10-CM

## 2023-07-22 LAB
ALBUMIN SERPL-MCNC: 4.5 G/DL (ref 3.5–5)
ALP SERPL-CCNC: 68 U/L (ref 45–120)
ALT SERPL W P-5'-P-CCNC: 35 U/L (ref 0–45)
ANION GAP SERPL CALCULATED.3IONS-SCNC: 9 MMOL/L (ref 5–18)
AST SERPL W P-5'-P-CCNC: 18 U/L (ref 0–40)
BASOPHILS # BLD AUTO: 0.1 10E3/UL (ref 0–0.2)
BASOPHILS NFR BLD AUTO: 1 %
BILIRUB SERPL-MCNC: 0.6 MG/DL (ref 0–1)
BUN SERPL-MCNC: 11 MG/DL (ref 8–22)
C REACTIVE PROTEIN LHE: <0.1 MG/DL (ref 0–?)
CALCIUM SERPL-MCNC: 9.4 MG/DL (ref 8.5–10.5)
CHLORIDE BLD-SCNC: 108 MMOL/L (ref 98–107)
CO2 SERPL-SCNC: 23 MMOL/L (ref 22–31)
CREAT SERPL-MCNC: 1.08 MG/DL (ref 0.7–1.3)
EOSINOPHIL # BLD AUTO: 0.1 10E3/UL (ref 0–0.7)
EOSINOPHIL NFR BLD AUTO: 1 %
ERYTHROCYTE [DISTWIDTH] IN BLOOD BY AUTOMATED COUNT: 12.7 % (ref 10–15)
GFR SERPL CREATININE-BSD FRML MDRD: 89 ML/MIN/1.73M2
GLUCOSE BLD-MCNC: 101 MG/DL (ref 70–125)
HCT VFR BLD AUTO: 43.1 % (ref 40–53)
HGB BLD-MCNC: 15 G/DL (ref 13.3–17.7)
IMM GRANULOCYTES # BLD: 0 10E3/UL
IMM GRANULOCYTES NFR BLD: 0 %
LIPASE SERPL-CCNC: 41 U/L (ref 0–52)
LYMPHOCYTES # BLD AUTO: 2.3 10E3/UL (ref 0.8–5.3)
LYMPHOCYTES NFR BLD AUTO: 31 %
MCH RBC QN AUTO: 29.2 PG (ref 26.5–33)
MCHC RBC AUTO-ENTMCNC: 34.8 G/DL (ref 31.5–36.5)
MCV RBC AUTO: 84 FL (ref 78–100)
MONOCYTES # BLD AUTO: 0.6 10E3/UL (ref 0–1.3)
MONOCYTES NFR BLD AUTO: 8 %
NEUTROPHILS # BLD AUTO: 4.3 10E3/UL (ref 1.6–8.3)
NEUTROPHILS NFR BLD AUTO: 59 %
NRBC # BLD AUTO: 0 10E3/UL
NRBC BLD AUTO-RTO: 0 /100
PLATELET # BLD AUTO: 274 10E3/UL (ref 150–450)
POTASSIUM BLD-SCNC: 4.3 MMOL/L (ref 3.5–5)
PROT SERPL-MCNC: 7.5 G/DL (ref 6–8)
RBC # BLD AUTO: 5.14 10E6/UL (ref 4.4–5.9)
SODIUM SERPL-SCNC: 140 MMOL/L (ref 136–145)
WBC # BLD AUTO: 7.4 10E3/UL (ref 4–11)

## 2023-07-22 PROCEDURE — 99285 EMERGENCY DEPT VISIT HI MDM: CPT | Mod: 25

## 2023-07-22 PROCEDURE — 250N000011 HC RX IP 250 OP 636: Mod: JZ | Performed by: EMERGENCY MEDICINE

## 2023-07-22 PROCEDURE — 80053 COMPREHEN METABOLIC PANEL: CPT | Performed by: EMERGENCY MEDICINE

## 2023-07-22 PROCEDURE — 96361 HYDRATE IV INFUSION ADD-ON: CPT

## 2023-07-22 PROCEDURE — 85025 COMPLETE CBC W/AUTO DIFF WBC: CPT | Performed by: EMERGENCY MEDICINE

## 2023-07-22 PROCEDURE — 74177 CT ABD & PELVIS W/CONTRAST: CPT

## 2023-07-22 PROCEDURE — 258N000003 HC RX IP 258 OP 636: Performed by: EMERGENCY MEDICINE

## 2023-07-22 PROCEDURE — 36415 COLL VENOUS BLD VENIPUNCTURE: CPT | Performed by: EMERGENCY MEDICINE

## 2023-07-22 PROCEDURE — 96374 THER/PROPH/DIAG INJ IV PUSH: CPT | Mod: 59

## 2023-07-22 PROCEDURE — 83690 ASSAY OF LIPASE: CPT | Performed by: EMERGENCY MEDICINE

## 2023-07-22 PROCEDURE — 86140 C-REACTIVE PROTEIN: CPT | Performed by: EMERGENCY MEDICINE

## 2023-07-22 RX ORDER — IOPAMIDOL 755 MG/ML
100 INJECTION, SOLUTION INTRAVASCULAR ONCE
Status: COMPLETED | OUTPATIENT
Start: 2023-07-22 | End: 2023-07-22

## 2023-07-22 RX ORDER — ONDANSETRON 4 MG/1
4 TABLET, ORALLY DISINTEGRATING ORAL EVERY 8 HOURS PRN
Qty: 12 TABLET | Refills: 0 | Status: SHIPPED | OUTPATIENT
Start: 2023-07-22

## 2023-07-22 RX ORDER — ONDANSETRON 2 MG/ML
4 INJECTION INTRAMUSCULAR; INTRAVENOUS ONCE
Status: COMPLETED | OUTPATIENT
Start: 2023-07-22 | End: 2023-07-22

## 2023-07-22 RX ADMIN — ONDANSETRON 4 MG: 2 INJECTION INTRAMUSCULAR; INTRAVENOUS at 16:29

## 2023-07-22 RX ADMIN — IOPAMIDOL 100 ML: 755 INJECTION, SOLUTION INTRAVENOUS at 17:23

## 2023-07-22 RX ADMIN — SODIUM CHLORIDE 1000 ML: 9 INJECTION, SOLUTION INTRAVENOUS at 16:29

## 2023-07-22 ASSESSMENT — ENCOUNTER SYMPTOMS
LIGHT-HEADEDNESS: 1
VOMITING: 0
SHORTNESS OF BREATH: 0
DIAPHORESIS: 1
BLOOD IN STOOL: 0
ABDOMINAL PAIN: 1
FEVER: 0
CHILLS: 0
DYSURIA: 0
WEAKNESS: 0
FATIGUE: 0
HEADACHES: 0
FREQUENCY: 1
COUGH: 0

## 2023-07-22 ASSESSMENT — ACTIVITIES OF DAILY LIVING (ADL): ADLS_ACUITY_SCORE: 35

## 2023-07-22 NOTE — ED PROVIDER NOTES
EMERGENCY DEPARTMENT ENCOUNTER      NAME: Yoseph Duong  AGE: 38 year old male  YOB: 1985  MRN: [id number]  EVALUATION DATE & TIME: 7/22/2023  3:31 PM    PCP: Carlie Cerrato    ED PROVIDER: Princess Esparza DO      Chief Complaint   Patient presents with     Abdominal Pain     Nausea, Vomiting, & Diarrhea         FINAL IMPRESSION:  1. RLQ abdominal pain          ED COURSE & MEDICAL DECISION MAKING:    Pertinent Labs & Imaging studies reviewed. (See chart for details)  4:03 PM I met the patient and performed my initial interview and exam.  6:27 PM We discussed the plan for discharge and the patient is agreeable. Reviewed supportive cares, symptomatic treatment, outpatient follow up, and reasons to return to the Emergency Department. All questions and concerns were addressed. Patient to be discharged by ED RN.       38 year old male presents to the Emergency Department for evaluation of right lower quadrant abdominal pain, diarrhea, nausea.  Reports symptoms worse today.  Sounds like he has been having some intermittent GI issues.  Follows with gastroenterology at [facility].  Scheduled for a colonoscopy in 1 month.  Prior cholecystectomy, umbilical hernia surgery and testicular removal for cancer.  Not consistent with a bowel obstruction.  Lower likelihood of appendicitis given intermittent symptoms, however will need to rule this out today.  No family history of Crohn's or ulcerative colitis.  No blood in the stool.  Patient was given IV fluids and Zofran for symptomatic care.  Labs show acute abnormalities.  No leukocytosis.  No acute anemia.  CRP is normal.  No elevation in LFTs.  Normal creatinine.  CT imaging obtained and shows no acute findings to explain symptoms.  Patient repeat evaluation and remains nontoxic-appearing.  His vitals unremarkable.  He is afebrile.  We did discuss his results.  I would encourage him to continue supportive management.  I did write him for some Zofran as needed.   He is following up closely with GI and has a colonoscopy planned which I think would be most helpful for him.  We discussed bland diet.  He is inquiring about stool softeners as he has a bowel movement every 2 to 3 days I think this to be reasonable however I would avoid any laxatives.  We discussed return precautions including intractable pain, high fevers, persistent vomiting or any other concerns.    At the conclusion of the encounter I discussed the results of all of the tests and the disposition. The questions were answered. The patient or family acknowledged understanding and was agreeable with the care plan.     Medical Decision Making    History:    Supplemental history from: Documented in chart, if applicable and Family Member/Significant Other    External Record(s) reviewed: Documented in chart, if applicable. and Outpatient Record: see HPI    Work Up:    Chart documentation includes differential considered and any EKGs or imaging independently interpreted by provider, where specified.    In additional to work up documented, I considered the following work up: Documented in chart, if applicable.    External consultation:    Discussion of management with another provider: Documented in chart, if applicable    Complicating factors:    Care impacted by chronic illness: N/A    Care affected by social determinants of health: N/A    Disposition considerations: Discharge. I prescribed additional prescription strength medication(s) as charted. See documentation for any additional details.      MEDICATIONS GIVEN IN THE EMERGENCY:  Medications   0.9% sodium chloride BOLUS (0 mLs Intravenous Stopped 7/22/23 1839)   ondansetron (ZOFRAN) injection 4 mg (4 mg Intravenous $Given 7/22/23 1629)   iopamidol (ISOVUE-370) solution 100 mL (100 mLs Intravenous $Given 7/22/23 1723)       NEW PRESCRIPTIONS STARTED AT TODAY'S ER VISIT  Discharge Medication List as of 7/22/2023  6:48 PM      START taking these medications     Details   ondansetron (ZOFRAN ODT) 4 MG ODT tab Take 1 tablet (4 mg) by mouth every 8 hours as needed for nausea, Disp-12 tablet, R-0, Local Print                =================================================================    HPI    Patient information was obtained from: patient    Use of : N/A      Yoseph Duong is a 38 year old male with a pertinent history of cancer who presents to this ED via walk-in for evaluation of abdominal pain.    Per chart review, the patient presented to [facility] via telemedicine on 6/20/2023 for evaluation of chest pain. Patient reported that he had been experiencing heartburn and new chest pain, as well as changes in bowel habits. He was instructed to take Pantoprazole and Famotidine and follow anti-reflux measures. A stool test was also scheduled and possibility of a colonoscopy was discussed.    Patient endorses pinching, tight abdominal pain in the right lower and right upper quadrants. The pain has been persistent for about 4 months and is worsening as of today. He is experiencing lightheadedness, diaphoresis, and increased bladder and bowel movements and urgency. He denies vomiting, heamtochezia, fever, and dysuria. He reports no diet changes, no recent travel, and no sick contacts.      REVIEW OF SYSTEMS   Review of Systems   Constitutional: Positive for diaphoresis. Negative for chills, fatigue and fever.   Respiratory: Negative for cough and shortness of breath.    Cardiovascular: Negative for chest pain.   Gastrointestinal: Positive for abdominal pain. Negative for blood in stool and vomiting.   Genitourinary: Positive for frequency and urgency. Negative for dysuria.   Skin: Negative.    Neurological: Positive for light-headedness. Negative for syncope, weakness and headaches.       PAST MEDICAL HISTORY:  History reviewed. No pertinent past medical history.    PAST SURGICAL HISTORY:  Past Surgical History:   Procedure  "Laterality Date     ADENOIDECTOMY       ENDOSCOPIC RETROGRADE CHOLANGIOPANCREATOGRAM N/A 12/7/2016    Procedure: ENDOSCOPIC RETROGRADE CHOLANGIOPANCREATOGRAPHY;  Surgeon: Theodore Rojas MD;  Location: [facility];  Service:      LAPAROSCOPIC CHOLECYSTECTOMY N/A 12/9/2014    Procedure: CHOLECYSTECTOMY LAPAROSCOPIC;  Surgeon: Kashif Coffman MD;  Location: [facility] OR;  Service:            CURRENT MEDICATIONS:    ondansetron (ZOFRAN ODT) 4 MG ODT tab  phenylephrine-bropheniramine-dextromethorphan (DIMETAPP-DM) 2.5-1-5 MG/5ML ELIX solution  sertraline (ZOLOFT) 100 MG tablet         ALLERGIES:  Allergies   Allergen Reactions     Haemophilus Influenzae Anaphylaxis and Hives       FAMILY HISTORY:  Family History   Problem Relation Age of Onset     CABG Father      Coronary Artery Disease Paternal Grandfather        SOCIAL HISTORY:   Social History     Socioeconomic History     Marital status:    Tobacco Use     Smoking status: Never     Smokeless tobacco: Never   Substance and Sexual Activity     Alcohol use: No     Drug use: No       VITALS:  /66   Pulse 83   Temp 98.8  F (37.1  C) (Temporal)   Resp 18   Ht 1.702 m (5' 7\")   Wt 79.4 kg (175 lb)   SpO2 98%   BMI 27.41 kg/m      PHYSICAL EXAM    Physical Exam  Constitutional:       General: He is not in acute distress.  HENT:      Head: Normocephalic and atraumatic.      Mouth/Throat:      Pharynx: Oropharynx is clear.   Eyes:      Pupils: Pupils are equal, round, and reactive to light.   Cardiovascular:      Rate and Rhythm: Normal rate and regular rhythm.      Pulses: Normal pulses.      Heart sounds: Normal heart sounds.   Pulmonary:      Effort: Pulmonary effort is normal.      Breath sounds: Normal breath sounds.   Abdominal:      General: Abdomen is flat. Bowel sounds are normal.      Palpations: Abdomen is soft.      Tenderness: There is abdominal tenderness (periumbilical).   Musculoskeletal:         General: Normal range of motion. "   Skin:     General: Skin is warm and dry.      Capillary Refill: Capillary refill takes less than 2 seconds.   Neurological:      General: No focal deficit present.      Mental Status: He is alert and oriented to person, place, and time.          LAB:  All pertinent labs reviewed and interpreted.  Labs Ordered and Resulted from Time of ED Arrival to Time of ED Departure   COMPREHENSIVE METABOLIC PANEL - Abnormal       Result Value    Sodium 140      Potassium 4.3      Chloride 108 (*)     Carbon Dioxide (CO2) 23      Anion Gap 9      Urea Nitrogen 11      Creatinine 1.08      Calcium 9.4      Glucose 101      Alkaline Phosphatase 68      AST 18      ALT 35      Protein Total 7.5      Albumin 4.5      Bilirubin Total 0.6      GFR Estimate 89     LIPASE - Normal    Lipase 41     CRP INFLAMMATION - Normal    CRP <0.1     CBC WITH PLATELETS AND DIFFERENTIAL    WBC Count 7.4      RBC Count 5.14      Hemoglobin 15.0      Hematocrit 43.1      MCV 84      MCH 29.2      MCHC 34.8      RDW 12.7      Platelet Count 274      % Neutrophils 59      % Lymphocytes 31      % Monocytes 8      % Eosinophils 1      % Basophils 1      % Immature Granulocytes 0      NRBCs per 100 WBC 0      Absolute Neutrophils 4.3      Absolute Lymphocytes 2.3      Absolute Monocytes 0.6      Absolute Eosinophils 0.1      Absolute Basophils 0.1      Absolute Immature Granulocytes 0.0      Absolute NRBCs 0.0         RADIOLOGY:  Reviewed all pertinent imaging. Please see official radiology report.  CT Abdomen Pelvis w Contrast   Final Result   IMPRESSION:    1.  No appendicitis.      2.  No urinary calculi or hydronephrosis.      3.  Previous cholecystectomy.      4.  Left testicular prosthesis.            ILuis M, am serving as a scribe to document services personally performed by Dr. Princess Esparza based on my observation and the provider's statements to me. Princess GÓMEZ, DO attest that Luis M Barrera is acting in a scribe capacity, has  observed my performance of the services and has documented them in accordance with my direction.    Princess Esparza DO  Emergency Medicine  [facility] EMERGENCY ROOM  [address] [id number] [phone]  Dept: [phone]     Princess Esparza DO  07/22/23 9498

## 2023-07-22 NOTE — DISCHARGE INSTRUCTIONS
As discussed, your testing and imaging today shows no signs of any life-threatening emergency  I would recommend you continue your pantoprazole and famotidine  Ondansetron prescription as needed for any nausea  You may add on a gentle stool softener daily such as Colace or senna, I would avoid any laxatives at this point  Fluid hydrate, rest  New could also try some Metamucil daily  Follow-up closely with gastroenterology as scheduled, I agree with a colonoscopy  Return if any acute worsening symptoms such as uncontrolled pain, fevers, persistent vomiting  Passing out episodes or any other concerns

## 2023-07-22 NOTE — ED TRIAGE NOTES
Pt here with GI symptoms and abdominal discomfort. States it stared this am, with diarrhea times once but had two loose stools prior to that. Vomited once. No blood seen.Pt sees DR Charlee Cooley for GI issues.  Pt said they have seen inflammation markers on stool tests but has not been told if he has any specific disease. Has GB removed, one testicle removed for CA and umbilical hernia surgery.

## 2023-08-06 ENCOUNTER — HEALTH MAINTENANCE LETTER (OUTPATIENT)
Age: 38
End: 2023-08-06

## 2024-09-28 ENCOUNTER — HEALTH MAINTENANCE LETTER (OUTPATIENT)
Age: 39
End: 2024-09-28